# Patient Record
Sex: MALE | Race: BLACK OR AFRICAN AMERICAN
[De-identification: names, ages, dates, MRNs, and addresses within clinical notes are randomized per-mention and may not be internally consistent; named-entity substitution may affect disease eponyms.]

---

## 2017-06-23 ENCOUNTER — HOSPITAL ENCOUNTER (EMERGENCY)
Dept: HOSPITAL 53 - M ED | Age: 37
Discharge: HOME | End: 2017-06-23
Payer: SELF-PAY

## 2017-06-23 VITALS — BODY MASS INDEX: 46.54 KG/M2 | HEIGHT: 65 IN | WEIGHT: 279.33 LBS

## 2017-06-23 VITALS — SYSTOLIC BLOOD PRESSURE: 130 MMHG | DIASTOLIC BLOOD PRESSURE: 79 MMHG

## 2017-06-23 DIAGNOSIS — E66.9: ICD-10-CM

## 2017-06-23 DIAGNOSIS — I11.0: Primary | ICD-10-CM

## 2017-06-23 DIAGNOSIS — E78.5: ICD-10-CM

## 2017-06-23 DIAGNOSIS — F17.200: ICD-10-CM

## 2017-06-23 LAB
ANION GAP SERPL CALC-SCNC: 6 MEQ/L (ref 8–16)
BASOPHILS # BLD AUTO: 0.2 K/MM3 (ref 0–0.2)
BASOPHILS NFR BLD AUTO: 2 % (ref 0–1)
BUN SERPL-MCNC: 13 MG/DL (ref 7–18)
CALCIUM SERPL-MCNC: 9.2 MG/DL (ref 8.5–10.1)
CHLORIDE SERPL-SCNC: 103 MEQ/L (ref 98–107)
CO2 SERPL-SCNC: 30 MEQ/L (ref 21–32)
CREAT SERPL-MCNC: 1.11 MG/DL (ref 0.7–1.3)
EOSINOPHIL # BLD AUTO: 0.3 K/MM3 (ref 0–0.5)
EOSINOPHIL NFR BLD AUTO: 2.3 % (ref 0–3)
ERYTHROCYTE [DISTWIDTH] IN BLOOD BY AUTOMATED COUNT: 14 % (ref 11.5–14.5)
GFR SERPL CREATININE-BSD FRML MDRD: > 60 ML/MIN/{1.73_M2} (ref 60–?)
GLUCOSE SERPL-MCNC: 88 MG/DL (ref 70–105)
LARGE UNSTAINED CELL #: 0.2 K/MM3 (ref 0–0.4)
LARGE UNSTAINED CELL %: 1.6 % (ref 0–4)
LYMPHOCYTES # BLD AUTO: 3 K/MM3 (ref 1.5–4.5)
LYMPHOCYTES NFR BLD AUTO: 26.7 % (ref 24–44)
MCH RBC QN AUTO: 31.4 PG (ref 27–33)
MCHC RBC AUTO-ENTMCNC: 33.5 G/DL (ref 32–36.5)
MCV RBC AUTO: 93.9 FL (ref 80–96)
MONOCYTES # BLD AUTO: 0.7 K/MM3 (ref 0–0.8)
MONOCYTES NFR BLD AUTO: 5.9 % (ref 0–5)
NEUTROPHILS # BLD AUTO: 6.8 K/MM3 (ref 1.8–7.7)
NEUTROPHILS NFR BLD AUTO: 61.4 % (ref 36–66)
PLATELET # BLD AUTO: 251 K/MM3 (ref 150–450)
POTASSIUM SERPL-SCNC: 3.8 MEQ/L (ref 3.5–5.1)
SODIUM SERPL-SCNC: 139 MEQ/L (ref 136–145)
WBC # BLD AUTO: 11.1 K/MM3 (ref 4–10)

## 2017-06-23 NOTE — REP
CHEST, TWO VIEWS:

 

COMPARISON:  07/30/2010.

 

There is no evidence of acute infiltrate.

 

No pleural effusion is seen.

 

The heart is normal in size.

 

The mediastinal silhouette is unremarkable.

 

The visualized osseous structures are intact.

 

IMPRESSION:

 

No acute pulmonary disease.

 

 

Signed by

Dmitry Owens MD 06/23/2017 03:28 P

## 2017-06-23 NOTE — REP
Hypertension and headache.

 

PRIORS:   None.

 

TECHNIQUE:  4.5 mm contiguous transaxial sections were obtained from the skull

base to the cerebral convexities with thin cuts through the posterior fossa

without the administration of intravenous contrast.

 

FINDINGS:  The ventricles and sulci are consistent with the patient's age.  There

are no extra-axial fluid collections.  There is no mass effect.  The deep

cerebral white matter is consistent with the patient's age.

 

The orbital and petrous structures , cerebellopontine angles, and posterior fossa

are unremarkable.

 

The sella turcica, cavernous, and paracavernous structures are essentially

unremarkable.

 

The visualized portions of the paranasal sinuses and mastoid air cells are

clear.

 

Images of the skull base show no gross abnormality.

 

IMPRESSION:

 

Essentially unremarkable CT examination of the brain.

 

 

Signed by

Mike Connor DO 06/23/2017 01:43 P

## 2017-06-24 NOTE — ECGEPIP
Stationary ECG Study

                           [facility] - ED

                                       

                                       Test Date:    2017

Pat Name:     NICOLE KNOTT            Department:   

Patient ID:   [id number]                 Room:         -

Gender:       M                        Technician:   rn

:          1980               Requested By: PATRICK DUMONT

Order Number: [id number]     Reading MD:   Roni Link

                                 Measurements

Intervals                              Axis          

Rate:         80                       P:            30

MN:           159                      QRS:          21

QRSD:         97                       T:            48

QT:           366                                    

QTc:          424                                    

                           Interpretive Statements

SINUS RHYTHM

INC. RBBB

SIMILAR TO 13

 

Electronically Signed On 2017 5:54:45 EDT by Roni Link

## 2017-12-12 ENCOUNTER — HOSPITAL ENCOUNTER (OUTPATIENT)
Dept: HOSPITAL 53 - M SFHCPLAZ | Age: 37
End: 2017-12-12
Attending: NURSE PRACTITIONER
Payer: COMMERCIAL

## 2017-12-12 DIAGNOSIS — I10: Primary | ICD-10-CM

## 2017-12-12 LAB
ALBUMIN SERPL BCG-MCNC: 3.6 GM/DL (ref 3.2–5.2)
ALBUMIN/GLOB SERPL: 0.92 {RATIO} (ref 1–1.93)
ALP SERPL-CCNC: 76 U/L (ref 45–117)
ALT SERPL W P-5'-P-CCNC: 36 U/L (ref 12–78)
ANION GAP SERPL CALC-SCNC: 9 MEQ/L (ref 8–16)
AST SERPL-CCNC: 19 U/L (ref 7–37)
BILIRUB SERPL-MCNC: 0.4 MG/DL (ref 0.2–1)
BUN SERPL-MCNC: 10 MG/DL (ref 7–18)
CALCIUM SERPL-MCNC: 8.7 MG/DL (ref 8.5–10.1)
CHLORIDE SERPL-SCNC: 105 MEQ/L (ref 98–107)
CO2 SERPL-SCNC: 26 MEQ/L (ref 21–32)
CREAT SERPL-MCNC: 1.14 MG/DL (ref 0.7–1.3)
GFR SERPL CREATININE-BSD FRML MDRD: > 60 ML/MIN/{1.73_M2} (ref 60–?)
GLUCOSE SERPL-MCNC: 120 MG/DL (ref 70–105)
POTASSIUM SERPL-SCNC: 4.2 MEQ/L (ref 3.5–5.1)
PROT SERPL-MCNC: 7.5 GM/DL (ref 6.4–8.2)
SODIUM SERPL-SCNC: 140 MEQ/L (ref 136–145)

## 2018-04-20 ENCOUNTER — HOSPITAL ENCOUNTER (EMERGENCY)
Dept: HOSPITAL 53 - M ED | Age: 38
LOS: 1 days | Discharge: HOME | End: 2018-04-21
Payer: COMMERCIAL

## 2018-04-20 DIAGNOSIS — E66.9: ICD-10-CM

## 2018-04-20 DIAGNOSIS — I10: ICD-10-CM

## 2018-04-20 DIAGNOSIS — F10.10: ICD-10-CM

## 2018-04-20 DIAGNOSIS — Z79.899: ICD-10-CM

## 2018-04-20 DIAGNOSIS — G47.30: ICD-10-CM

## 2018-04-20 DIAGNOSIS — M54.32: Primary | ICD-10-CM

## 2018-04-21 RX ADMIN — HYDROCODONE BITARTRATE AND ACETAMINOPHEN 1 TAB: 5; 325 TABLET ORAL at 00:55

## 2018-04-21 RX ADMIN — KETOROLAC TROMETHAMINE 1 MG: 30 INJECTION, SOLUTION INTRAMUSCULAR at 00:55

## 2018-06-17 ENCOUNTER — HOSPITAL ENCOUNTER (OUTPATIENT)
Dept: HOSPITAL 53 - M SLEEP | Age: 38
End: 2018-06-17
Attending: NURSE PRACTITIONER
Payer: COMMERCIAL

## 2018-06-17 DIAGNOSIS — G47.33: Primary | ICD-10-CM

## 2018-06-17 PROCEDURE — 95811 POLYSOM 6/>YRS CPAP 4/> PARM: CPT

## 2018-08-14 ENCOUNTER — HOSPITAL ENCOUNTER (OUTPATIENT)
Dept: HOSPITAL 53 - M LAB | Age: 38
End: 2018-08-14
Attending: NURSE PRACTITIONER
Payer: COMMERCIAL

## 2018-08-14 DIAGNOSIS — Z13.220: ICD-10-CM

## 2018-08-14 DIAGNOSIS — E66.9: ICD-10-CM

## 2018-08-14 DIAGNOSIS — I10: Primary | ICD-10-CM

## 2018-08-14 LAB
ALBUMIN/GLOBULIN RATIO: 0.95 (ref 1–1.93)
ALBUMIN: 3.6 GM/DL (ref 3.2–5.2)
ALKALINE PHOSPHATASE: 75 U/L (ref 45–117)
ALT SERPL W P-5'-P-CCNC: 39 U/L (ref 12–78)
ANION GAP: 7 MEQ/L (ref 8–16)
AST SERPL-CCNC: 24 U/L (ref 7–37)
BILIRUBIN,TOTAL: 0.9 MG/DL (ref 0.2–1)
BLOOD UREA NITROGEN: 13 MG/DL (ref 7–18)
CALCIUM LEVEL: 8.9 MG/DL (ref 8.5–10.1)
CARBON DIOXIDE LEVEL: 29 MEQ/L (ref 21–32)
CHLORIDE LEVEL: 107 MEQ/L (ref 98–107)
CHOLEST SERPL-MCNC: 153 MG/DL (ref ?–200)
CHOLESTEROL RISK RATIO: 2.35 (ref ?–5)
CREAT UR-MCNC: 397 MG/DL
CREATININE FOR GFR: 1.24 MG/DL (ref 0.7–1.3)
FREE T4: 1.39 NG/DL (ref 0.76–1.46)
GFR SERPL CREATININE-BSD FRML MDRD: > 60 ML/MIN/{1.73_M2} (ref 60–?)
GLUCOSE, FASTING: 87 MG/DL (ref 70–100)
HDLC SERPL-MCNC: 65 MG/DL (ref 40–?)
LDL CHOLESTEROL: 74 MG/DL (ref ?–100)
MICROALBUMIN UR-MCNC: 591 MG/L
MICROALBUMIN/CREAT UR: 148.8 MCG/MG (ref 0–30)
NONHDLC SERPL-MCNC: 88 MG/DL
POTASSIUM SERUM: 4 MEQ/L (ref 3.5–5.1)
SODIUM LEVEL: 143 MEQ/L (ref 136–145)
THYROID STIMULATING HORMONE: 1.49 UIU/ML (ref 0.36–3.74)
TOTAL PROTEIN: 7.4 GM/DL (ref 6.4–8.2)
TRIGLYCERIDES LEVEL: 70 MG/DL (ref ?–150)

## 2018-08-14 PROCEDURE — 84443 ASSAY THYROID STIM HORMONE: CPT

## 2018-10-01 ENCOUNTER — HOSPITAL ENCOUNTER (OUTPATIENT)
Dept: HOSPITAL 53 - M LAB | Age: 38
End: 2018-10-01
Attending: PHYSICIAN ASSISTANT
Payer: COMMERCIAL

## 2018-10-01 DIAGNOSIS — I10: Primary | ICD-10-CM

## 2018-10-01 LAB
ANION GAP: 8 MEQ/L (ref 8–16)
BLOOD UREA NITROGEN: 17 MG/DL (ref 7–18)
CALCIUM LEVEL: 9.3 MG/DL (ref 8.5–10.1)
CARBON DIOXIDE LEVEL: 27 MEQ/L (ref 21–32)
CHLORIDE LEVEL: 103 MEQ/L (ref 98–107)
CREATININE FOR GFR: 1.37 MG/DL (ref 0.7–1.3)
GFR SERPL CREATININE-BSD FRML MDRD: > 60 ML/MIN/{1.73_M2} (ref 60–?)
GLUCOSE, FASTING: 128 MG/DL (ref 70–100)
POTASSIUM SERUM: 4.1 MEQ/L (ref 3.5–5.1)
SODIUM LEVEL: 138 MEQ/L (ref 136–145)

## 2018-10-01 PROCEDURE — 80048 BASIC METABOLIC PNL TOTAL CA: CPT

## 2018-11-05 ENCOUNTER — HOSPITAL ENCOUNTER (OUTPATIENT)
Dept: HOSPITAL 53 - M SMT | Age: 38
End: 2018-11-05
Attending: PHYSICIAN ASSISTANT
Payer: COMMERCIAL

## 2018-11-05 DIAGNOSIS — I10: Primary | ICD-10-CM

## 2018-11-05 LAB
ANION GAP: 11 MEQ/L (ref 8–16)
BLOOD UREA NITROGEN: 11 MG/DL (ref 7–18)
CALCIUM LEVEL: 8.7 MG/DL (ref 8.5–10.1)
CARBON DIOXIDE LEVEL: 23 MEQ/L (ref 21–32)
CHLORIDE LEVEL: 106 MEQ/L (ref 98–107)
CREATININE FOR GFR: 1.14 MG/DL (ref 0.7–1.3)
GFR SERPL CREATININE-BSD FRML MDRD: > 60 ML/MIN/{1.73_M2} (ref 60–?)
GLUCOSE, FASTING: 90 MG/DL (ref 70–100)
POTASSIUM SERUM: 4.1 MEQ/L (ref 3.5–5.1)
SODIUM LEVEL: 140 MEQ/L (ref 136–145)

## 2018-11-05 PROCEDURE — 80048 BASIC METABOLIC PNL TOTAL CA: CPT

## 2019-01-04 ENCOUNTER — HOSPITAL ENCOUNTER (OUTPATIENT)
Dept: HOSPITAL 53 - M RAD | Age: 39
End: 2019-01-04
Attending: NURSE PRACTITIONER
Payer: COMMERCIAL

## 2019-01-04 DIAGNOSIS — L72.9: Primary | ICD-10-CM

## 2019-01-04 DIAGNOSIS — N43.3: ICD-10-CM

## 2019-01-04 DIAGNOSIS — I86.1: ICD-10-CM

## 2019-01-04 NOTE — REP
Scrotal sonography:

 

History:  Scrotal cyst. Palpable area times 6 months.  Fluid leaked 1 month ago.

 

Findings:  There are small bilateral hydroceles.  Small bilateral varicoceles are

noted as well.  No intratesticular mass lesion is seen on either side.

Testicular Doppler flow is normal bilaterally.  Resistive indices are 0.59 on the

right and 0.68 on the left by Doppler.  Right testicular dimensions are 4.5 x 2.4

x 3.7 cm.  The left testis measures 4.3 x 2.4 x 3.1 cm.  There are cysts in the

head of the epididymis on the left, the largest of which measures 0.4 cm.

 

At the area of the palpable abnormality in the left scrotum there is focal wall

thickening and hyperemia surrounding a hypoechoic 2.0 x 0.5 x 2.1 cm intradermal

fluid collection consistent with a small abscess.

 

Impression:

 

No intratesticular mass lesion seen.  Small bilateral varicoceles and hydroceles.

Focal scrotal wall thickening and intradermal elongate fluid collection

consistent with abscess.  Tiny left epididymal cyst.

 

 

 

 

Electronically Signed by

Humphrey Srinivasan MD 01/04/2019 07:29 P

## 2019-02-25 ENCOUNTER — HOSPITAL ENCOUNTER (OUTPATIENT)
Dept: HOSPITAL 53 - M SMT | Age: 39
End: 2019-02-25
Attending: PHYSICIAN ASSISTANT
Payer: COMMERCIAL

## 2019-02-25 DIAGNOSIS — I10: Primary | ICD-10-CM

## 2019-02-25 LAB
BUN SERPL-MCNC: 20 MG/DL (ref 7–18)
CALCIUM SERPL-MCNC: 9 MG/DL (ref 8.5–10.1)
CHLORIDE SERPL-SCNC: 103 MEQ/L (ref 98–107)
CO2 SERPL-SCNC: 32 MEQ/L (ref 21–32)
CREAT SERPL-MCNC: 1.3 MG/DL (ref 0.7–1.3)
GFR SERPL CREATININE-BSD FRML MDRD: > 60 ML/MIN/{1.73_M2} (ref 60–?)
GLUCOSE SERPL-MCNC: 136 MG/DL (ref 70–100)
POTASSIUM SERPL-SCNC: 4 MEQ/L (ref 3.5–5.1)
SODIUM SERPL-SCNC: 140 MEQ/L (ref 136–145)

## 2019-02-27 ENCOUNTER — HOSPITAL ENCOUNTER (OUTPATIENT)
Dept: HOSPITAL 53 - M RAD | Age: 39
End: 2019-02-27
Attending: UROLOGY
Payer: COMMERCIAL

## 2019-02-27 DIAGNOSIS — N49.2: Primary | ICD-10-CM

## 2019-02-27 NOTE — REP
Clinical:  Scrotal abscess.

 

Comparison:  01/04/2019.

 

Technique:  Gray scale and color Doppler evaluation using linear high frequency

transducer.

 

Findings:

The bilateral testicles and epididymi are normal in contour, size, echogenicity,

and vascularity without mass lesion, infectious/inflammatory process, or torsion.

Two incidental left epididymal head cysts measure up to 2.7 and 3.5 mm each.

Small bilateral varicoceles are again noted and unchanged.  No significant

hydrocele.

 

Left scrotal wall thickening and hypoechoic collection with surrounding

vascularity consistent with previous identified abscess currently measures

approximately 2.4 x 2.4 x 0.8 cm which may be minimally increased from prior

examination and should be correlated clinically as size comparison with prior

examination may be inaccurate due to technical factors.

 

Impression:

1.  Essentially normal/stable appearance to the bilateral testicles and epididymi

with small left epididymal head cysts and small bilateral varicoceles again noted

and unchanged.

2.  Left scrotal wall thickening and abscess again identified essentially similar

to prior examination.

 

 

Electronically Signed by

Fabiano Gallagher MD 02/27/2019 10:16 A

## 2019-03-01 ENCOUNTER — HOSPITAL ENCOUNTER (OUTPATIENT)
Dept: HOSPITAL 53 - M SMT | Age: 39
End: 2019-03-01
Attending: UROLOGY
Payer: COMMERCIAL

## 2019-03-01 ENCOUNTER — HOSPITAL ENCOUNTER (OUTPATIENT)
Dept: HOSPITAL 53 - M LABSMT | Age: 39
End: 2019-03-01
Attending: UROLOGY
Payer: COMMERCIAL

## 2019-03-01 DIAGNOSIS — N49.2: Primary | ICD-10-CM

## 2019-03-25 ENCOUNTER — HOSPITAL ENCOUNTER (OUTPATIENT)
Dept: HOSPITAL 53 - M SMT | Age: 39
End: 2019-03-25
Attending: NURSE PRACTITIONER
Payer: COMMERCIAL

## 2019-03-25 DIAGNOSIS — N49.2: ICD-10-CM

## 2019-03-25 DIAGNOSIS — Z01.818: Primary | ICD-10-CM

## 2019-03-25 LAB
APTT BLD: 31.1 SECONDS (ref 25.4–37.6)
BUN SERPL-MCNC: 16 MG/DL (ref 7–18)
CALCIUM SERPL-MCNC: 8.8 MG/DL (ref 8.5–10.1)
CHLORIDE SERPL-SCNC: 105 MEQ/L (ref 98–107)
CO2 SERPL-SCNC: 29 MEQ/L (ref 21–32)
CREAT SERPL-MCNC: 1.26 MG/DL (ref 0.7–1.3)
GFR SERPL CREATININE-BSD FRML MDRD: > 60 ML/MIN/{1.73_M2} (ref 60–?)
GLUCOSE SERPL-MCNC: 100 MG/DL (ref 70–100)
HCT VFR BLD AUTO: 46.5 % (ref 42–52)
HGB BLD-MCNC: 14.8 G/DL (ref 13.5–17.5)
INR PPP: 0.97
MCH RBC QN AUTO: 30.4 PG (ref 27–33)
MCHC RBC AUTO-ENTMCNC: 31.8 G/DL (ref 32–36.5)
MCV RBC AUTO: 95.5 FL (ref 80–96)
PLATELET # BLD AUTO: 282 10^3/UL (ref 150–450)
POTASSIUM SERPL-SCNC: 4.2 MEQ/L (ref 3.5–5.1)
PROTHROMBIN TIME: 13 SECONDS (ref 12.1–14.4)
RBC # BLD AUTO: 4.87 10^6/UL (ref 4.3–6.1)
SODIUM SERPL-SCNC: 139 MEQ/L (ref 136–145)
WBC # BLD AUTO: 11.6 10^3/UL (ref 4–10)

## 2019-04-11 ENCOUNTER — HOSPITAL ENCOUNTER (OUTPATIENT)
Dept: HOSPITAL 53 - M RAD | Age: 39
End: 2019-04-11
Attending: UROLOGY
Payer: COMMERCIAL

## 2019-04-11 DIAGNOSIS — N45.4: Primary | ICD-10-CM

## 2019-04-12 NOTE — REP
Clinical:  Follow up scrotal abscess.

 

Comparison:  02/27/2019.

 

Technique:  Real time gray scale and color Doppler evaluation using linear high

frequency transducer.

 

Findings:

Bilateral testicles and epididymi are relatively normal in contour, size,

echogenicity, and vascularity without evidence for torsion,

infectious/inflammatory process, or mass lesion.  Incidental left epididymal

cysts measure 3.5 x 1.9 x 3.0 mm and 2.9 x 2.2 x 3.2 mm.  Small bilateral

hydroceles are appreciated.  No residual abscess identified.

 

Right testicle measures 5.0 x 2.5 x 4.1 cm.

Left testicle measures 4.8 x 2.3 x 2.8 cm.

 

Impression:

Small left epididymal cysts.

Otherwise essentially normal examination.

No abscess identified on current examination.

 

 

Electronically Signed by

Fabiano Gallagher MD 04/12/2019 06:04 A

## 2019-07-26 ENCOUNTER — HOSPITAL ENCOUNTER (EMERGENCY)
Dept: HOSPITAL 53 - M ED | Age: 39
Discharge: HOME | End: 2019-07-26
Payer: COMMERCIAL

## 2019-07-26 VITALS — SYSTOLIC BLOOD PRESSURE: 107 MMHG | DIASTOLIC BLOOD PRESSURE: 55 MMHG

## 2019-07-26 VITALS — HEIGHT: 65 IN | BODY MASS INDEX: 45.88 KG/M2 | WEIGHT: 275.36 LBS

## 2019-07-26 DIAGNOSIS — Z79.899: ICD-10-CM

## 2019-07-26 DIAGNOSIS — F17.210: ICD-10-CM

## 2019-07-26 DIAGNOSIS — L03.211: Primary | ICD-10-CM

## 2019-07-26 DIAGNOSIS — I10: ICD-10-CM

## 2019-07-26 LAB
ALBUMIN SERPL BCG-MCNC: 3.5 GM/DL (ref 3.2–5.2)
ALT SERPL W P-5'-P-CCNC: 29 U/L (ref 12–78)
BASOPHILS # BLD AUTO: 0.1 10^3/UL (ref 0–0.2)
BASOPHILS NFR BLD AUTO: 0.5 % (ref 0–1)
BILIRUB CONJ SERPL-MCNC: < 0.1 MG/DL (ref 0–0.2)
BILIRUB SERPL-MCNC: 0.3 MG/DL (ref 0.2–1)
CRP SERPL-MCNC: 0.37 MG/DL (ref 0–0.3)
EOSINOPHIL # BLD AUTO: 0.1 10^3/UL (ref 0–0.5)
EOSINOPHIL NFR BLD AUTO: 0.9 % (ref 0–3)
ERYTHROCYTE [SEDIMENTATION RATE] IN BLOOD BY WESTERGREN METHOD: 12 MM/HR (ref 0–15)
HCT VFR BLD AUTO: 44 % (ref 42–52)
HGB BLD-MCNC: 14.6 G/DL (ref 13.5–17.5)
LYMPHOCYTES # BLD AUTO: 3.2 10^3/UL (ref 1.5–4.5)
LYMPHOCYTES NFR BLD AUTO: 28.7 % (ref 24–44)
MCH RBC QN AUTO: 32.1 PG (ref 27–33)
MCHC RBC AUTO-ENTMCNC: 33.2 G/DL (ref 32–36.5)
MCV RBC AUTO: 96.7 FL (ref 80–96)
MONOCYTES # BLD AUTO: 1.3 10^3/UL (ref 0–0.8)
MONOCYTES NFR BLD AUTO: 11.7 % (ref 0–5)
NEUTROPHILS # BLD AUTO: 6.4 10^3/UL (ref 1.8–7.7)
NEUTROPHILS NFR BLD AUTO: 58 % (ref 36–66)
PLATELET # BLD AUTO: 259 10^3/UL (ref 150–450)
PROT SERPL-MCNC: 7.5 GM/DL (ref 6.4–8.2)
RBC # BLD AUTO: 4.55 10^6/UL (ref 4.3–6.1)
WBC # BLD AUTO: 11 10^3/UL (ref 4–10)

## 2019-07-26 PROCEDURE — 85025 COMPLETE CBC W/AUTO DIFF WBC: CPT

## 2019-07-26 PROCEDURE — 87880 STREP A ASSAY W/OPTIC: CPT

## 2019-07-26 PROCEDURE — 96361 HYDRATE IV INFUSION ADD-ON: CPT

## 2019-07-26 PROCEDURE — 80076 HEPATIC FUNCTION PANEL: CPT

## 2019-07-26 PROCEDURE — 87040 BLOOD CULTURE FOR BACTERIA: CPT

## 2019-07-26 PROCEDURE — 70491 CT SOFT TISSUE NECK W/DYE: CPT

## 2019-07-26 PROCEDURE — 83605 ASSAY OF LACTIC ACID: CPT

## 2019-07-26 PROCEDURE — 80047 BASIC METABLC PNL IONIZED CA: CPT

## 2019-07-26 PROCEDURE — 99284 EMERGENCY DEPT VISIT MOD MDM: CPT

## 2019-07-26 PROCEDURE — 86140 C-REACTIVE PROTEIN: CPT

## 2019-07-26 PROCEDURE — 85652 RBC SED RATE AUTOMATED: CPT

## 2019-07-26 PROCEDURE — 96365 THER/PROPH/DIAG IV INF INIT: CPT

## 2019-07-26 NOTE — REPVR
EXAM: 

CT Neck With Contrast 



EXAM DATE/TIME: 

7/26/2019 6:01 PM 



CLINICAL HISTORY: 

38 years old, male; Other: Left facial abscess/ludwigs; Additional info: RO 

left facial abscess/ludwigs 



TECHNIQUE: 

Imaging protocol: Axial computed tomography images of the neck with intravenous 

contrast. Coronal and sagittal reformatted images were created and reviewed. 

Radiation optimization: All CT scans at this facility use at least one of these 

dose optimization techniques: automated exposure control; mA and/or kV 

adjustment per patient size (includes targeted exams where dose is matched to 

clinical indication); or iterative reconstruction. 

Contrast material: ISOVUE 370;Contrast volume: 75 ml;Contrast route: IV; 



COMPARISON: 

No relevant prior studies available. 



FINDINGS: 

Nasopharynx: Normal. 

Oropharynx: Bilateral thickening of the tonsillar pillars with narrowing of the 

hypopharyngeal airway. Small air pockets demonstrated anteriorly beneath the 

tongue may represent trapped air bubbles in the oral cavity although a small 

anterior abscess is not excluded. 

Hypopharynx: Normal. 

Larynx: Normal. Normal epiglottis. 

Retropharyngeal space: Normal. 

Submandibular/Parotid glands: Normal. Glands are normal in size. 

Thyroid: Normal. No enlarged or calcified nodules. 

Lymph nodes: Increased thickness of the soft palate or the presence of 

adenopathy. 

Trachea: Visualized trachea is unremarkable. 

Lungs: Normal as visualized. 

Bones/joints: Normal. No acute fracture. 

Soft tissues: There is symmetric thickening of the strap muscles of the neck 

without evidence of a abscess. Inflammation not excluded. 



IMPRESSION: 

1. There is symmetric thickening of the strap muscles of the neck without 

evidence of a abscess. Inflammation not excluded. 

2. Bilateral thickening of the tonsillar pillars with narrowing of the 

hypopharyngeal airway. 

3. Small air pockets demonstrated anteriorly beneath the tongue may represent 

trapped air bubbles in the oral cavity although a small anterior abscess is not 

excluded. 

4. Increased thickness of the soft palate or the presence of adenopathy. 



Electronically signed by: Giovanni iMnor On 07/26/2019  18:38:33 PM

## 2019-07-26 NOTE — ED PDOC
Post-Departure Follow-Up


dr barbosa faxed formal report of ct neck for fu mlg Lundborg-Gray,Maja MD          Jul 26, 2019 19:51

## 2019-10-01 ENCOUNTER — HOSPITAL ENCOUNTER (OUTPATIENT)
Dept: HOSPITAL 53 - M SFHCPLAZ | Age: 39
End: 2019-10-01
Attending: NURSE PRACTITIONER
Payer: COMMERCIAL

## 2019-10-01 DIAGNOSIS — I10: Primary | ICD-10-CM

## 2019-10-01 DIAGNOSIS — E66.9: ICD-10-CM

## 2019-10-01 DIAGNOSIS — E78.5: ICD-10-CM

## 2019-10-01 LAB
ALBUMIN SERPL BCG-MCNC: 3.4 GM/DL (ref 3.2–5.2)
ALT SERPL W P-5'-P-CCNC: 32 U/L (ref 12–78)
BILIRUB SERPL-MCNC: 0.4 MG/DL (ref 0.2–1)
BUN SERPL-MCNC: 16 MG/DL (ref 7–18)
CALCIUM SERPL-MCNC: 8.7 MG/DL (ref 8.5–10.1)
CHLORIDE SERPL-SCNC: 108 MEQ/L (ref 98–107)
CHOLEST SERPL-MCNC: 127 MG/DL (ref ?–200)
CHOLEST/HDLC SERPL: 2.95 {RATIO} (ref ?–5)
CO2 SERPL-SCNC: 26 MEQ/L (ref 21–32)
CREAT SERPL-MCNC: 1.28 MG/DL (ref 0.7–1.3)
CREAT UR-MCNC: 507 MG/DL
GFR SERPL CREATININE-BSD FRML MDRD: > 60 ML/MIN/{1.73_M2} (ref 60–?)
GLUCOSE SERPL-MCNC: 111 MG/DL (ref 70–100)
HDLC SERPL-MCNC: 43 MG/DL (ref 40–?)
LDLC SERPL CALC-MCNC: 68 MG/DL (ref ?–100)
MICROALBUMIN UR-MCNC: 125 MG/L
MICROALBUMIN/CREAT UR: 24.6 MCG/MG (ref 0–30)
NONHDLC SERPL-MCNC: 84 MG/DL
POTASSIUM SERPL-SCNC: 4.1 MEQ/L (ref 3.5–5.1)
PROT SERPL-MCNC: 6.9 GM/DL (ref 6.4–8.2)
SODIUM SERPL-SCNC: 143 MEQ/L (ref 136–145)
TRIGL SERPL-MCNC: 80 MG/DL (ref ?–150)
TSH SERPL DL<=0.005 MIU/L-ACNC: 0.9 UIU/ML (ref 0.36–3.74)

## 2020-03-06 ENCOUNTER — HOSPITAL ENCOUNTER (OUTPATIENT)
Dept: HOSPITAL 53 - M SFHCPLAZ | Age: 40
End: 2020-03-06
Attending: FAMILY MEDICINE
Payer: COMMERCIAL

## 2020-03-06 DIAGNOSIS — I11.9: ICD-10-CM

## 2020-03-06 DIAGNOSIS — F10.20: Primary | ICD-10-CM

## 2020-03-06 LAB
ALBUMIN SERPL BCG-MCNC: 3.6 GM/DL (ref 3.2–5.2)
ALT SERPL W P-5'-P-CCNC: 71 U/L (ref 12–78)
APTT BLD: 30.3 SECONDS (ref 25–38.4)
BILIRUB SERPL-MCNC: 0.3 MG/DL (ref 0.2–1)
BUN SERPL-MCNC: 16 MG/DL (ref 7–18)
CALCIUM SERPL-MCNC: 9.6 MG/DL (ref 8.5–10.1)
CHLORIDE SERPL-SCNC: 105 MEQ/L (ref 98–107)
CO2 SERPL-SCNC: 29 MEQ/L (ref 21–32)
CREAT SERPL-MCNC: 1.29 MG/DL (ref 0.7–1.3)
GFR SERPL CREATININE-BSD FRML MDRD: > 60 ML/MIN/{1.73_M2} (ref 60–?)
GLUCOSE SERPL-MCNC: 114 MG/DL (ref 70–100)
HCT VFR BLD AUTO: 46.2 % (ref 42–52)
HGB BLD-MCNC: 14.8 G/DL (ref 13.5–17.5)
INR PPP: 0.95
MCH RBC QN AUTO: 30.9 PG (ref 27–33)
MCHC RBC AUTO-ENTMCNC: 32 G/DL (ref 32–36.5)
MCV RBC AUTO: 96.5 FL (ref 80–96)
PLATELET # BLD AUTO: 268 10^3/UL (ref 150–450)
POTASSIUM SERPL-SCNC: 4.2 MEQ/L (ref 3.5–5.1)
PROT SERPL-MCNC: 7.2 GM/DL (ref 6.4–8.2)
PROTHROMBIN TIME: 12.4 SECONDS (ref 11.8–14)
RBC # BLD AUTO: 4.79 10^6/UL (ref 4.3–6.1)
SODIUM SERPL-SCNC: 139 MEQ/L (ref 136–145)
WBC # BLD AUTO: 11.1 10^3/UL (ref 4–10)

## 2020-06-07 ENCOUNTER — HOSPITAL ENCOUNTER (INPATIENT)
Dept: HOSPITAL 53 - M ED | Age: 40
LOS: 1 days | Discharge: HOME | DRG: 194 | End: 2020-06-08
Attending: INTERNAL MEDICINE | Admitting: INTERNAL MEDICINE
Payer: COMMERCIAL

## 2020-06-07 VITALS — SYSTOLIC BLOOD PRESSURE: 160 MMHG | DIASTOLIC BLOOD PRESSURE: 90 MMHG

## 2020-06-07 VITALS — DIASTOLIC BLOOD PRESSURE: 89 MMHG | SYSTOLIC BLOOD PRESSURE: 134 MMHG

## 2020-06-07 VITALS — SYSTOLIC BLOOD PRESSURE: 122 MMHG | DIASTOLIC BLOOD PRESSURE: 72 MMHG

## 2020-06-07 VITALS — BODY MASS INDEX: 49.81 KG/M2 | HEIGHT: 65 IN | WEIGHT: 298.95 LBS

## 2020-06-07 VITALS — SYSTOLIC BLOOD PRESSURE: 158 MMHG | DIASTOLIC BLOOD PRESSURE: 98 MMHG

## 2020-06-07 DIAGNOSIS — F17.200: ICD-10-CM

## 2020-06-07 DIAGNOSIS — E78.5: ICD-10-CM

## 2020-06-07 DIAGNOSIS — Z11.59: ICD-10-CM

## 2020-06-07 DIAGNOSIS — I50.9: ICD-10-CM

## 2020-06-07 DIAGNOSIS — E55.9: ICD-10-CM

## 2020-06-07 DIAGNOSIS — E66.01: ICD-10-CM

## 2020-06-07 DIAGNOSIS — N17.9: ICD-10-CM

## 2020-06-07 DIAGNOSIS — Z79.899: ICD-10-CM

## 2020-06-07 DIAGNOSIS — I27.20: ICD-10-CM

## 2020-06-07 DIAGNOSIS — J98.4: ICD-10-CM

## 2020-06-07 DIAGNOSIS — J44.1: ICD-10-CM

## 2020-06-07 DIAGNOSIS — M54.5: ICD-10-CM

## 2020-06-07 DIAGNOSIS — G47.33: ICD-10-CM

## 2020-06-07 DIAGNOSIS — I11.0: Primary | ICD-10-CM

## 2020-06-07 LAB
BASOPHILS # BLD AUTO: 0.1 10^3/UL (ref 0–0.2)
BASOPHILS NFR BLD AUTO: 0.6 % (ref 0–1)
BUN SERPL-MCNC: 24 MG/DL (ref 7–18)
CALCIUM SERPL-MCNC: 8.5 MG/DL (ref 8.5–10.1)
CHLORIDE SERPL-SCNC: 104 MEQ/L (ref 98–107)
CO2 SERPL-SCNC: 23 MEQ/L (ref 21–32)
CREAT SERPL-MCNC: 1.64 MG/DL (ref 0.7–1.3)
EOSINOPHIL # BLD AUTO: 0.1 10^3/UL (ref 0–0.5)
EOSINOPHIL NFR BLD AUTO: 0.9 % (ref 0–3)
GFR SERPL CREATININE-BSD FRML MDRD: 60 ML/MIN/{1.73_M2} (ref 60–?)
GLUCOSE SERPL-MCNC: 122 MG/DL (ref 70–100)
HCT VFR BLD AUTO: 49.4 % (ref 42–52)
HGB BLD-MCNC: 16.3 G/DL (ref 13.5–17.5)
LYMPHOCYTES # BLD AUTO: 3.9 10^3/UL (ref 1.5–5)
LYMPHOCYTES NFR BLD AUTO: 36.9 % (ref 24–44)
MCH RBC QN AUTO: 31.3 PG (ref 27–33)
MCHC RBC AUTO-ENTMCNC: 33 G/DL (ref 32–36.5)
MCV RBC AUTO: 95 FL (ref 80–96)
MONOCYTES # BLD AUTO: 0.9 10^3/UL (ref 0–0.8)
MONOCYTES NFR BLD AUTO: 8.5 % (ref 0–5)
NEUTROPHILS # BLD AUTO: 5.6 10^3/UL (ref 1.5–8.5)
NEUTROPHILS NFR BLD AUTO: 52.9 % (ref 36–66)
NT-PRO BNP: 26 PG/ML (ref ?–125)
PLATELET # BLD AUTO: 234 10^3/UL (ref 150–450)
POTASSIUM SERPL-SCNC: 4 MEQ/L (ref 3.5–5.1)
RBC # BLD AUTO: 5.2 10^6/UL (ref 4.3–6.1)
SODIUM SERPL-SCNC: 137 MEQ/L (ref 136–145)
WBC # BLD AUTO: 10.6 10^3/UL (ref 4–10)

## 2020-06-07 RX ADMIN — IPRATROPIUM BROMIDE AND ALBUTEROL SULFATE SCH ML: .5; 3 SOLUTION RESPIRATORY (INHALATION) at 05:12

## 2020-06-07 RX ADMIN — IPRATROPIUM BROMIDE AND ALBUTEROL SULFATE SCH ML: .5; 3 SOLUTION RESPIRATORY (INHALATION) at 16:07

## 2020-06-07 RX ADMIN — SODIUM CHLORIDE, PRESERVATIVE FREE SCH ML: 5 INJECTION INTRAVENOUS at 21:22

## 2020-06-07 RX ADMIN — ENOXAPARIN SODIUM SCH MG: 40 INJECTION SUBCUTANEOUS at 10:55

## 2020-06-07 RX ADMIN — HYDRALAZINE HYDROCHLORIDE SCH MG: 25 TABLET, FILM COATED ORAL at 14:00

## 2020-06-07 RX ADMIN — IPRATROPIUM BROMIDE AND ALBUTEROL SULFATE SCH ML: .5; 3 SOLUTION RESPIRATORY (INHALATION) at 05:32

## 2020-06-07 RX ADMIN — HYDRALAZINE HYDROCHLORIDE SCH MG: 25 TABLET, FILM COATED ORAL at 21:20

## 2020-06-07 RX ADMIN — DEXTROSE MONOHYDRATE SCH MG: 50 INJECTION, SOLUTION INTRAVENOUS at 17:27

## 2020-06-07 RX ADMIN — NICOTINE SCH PATCH: 21 PATCH, EXTENDED RELEASE TRANSDERMAL at 10:54

## 2020-06-07 NOTE — HPEPDOC
General


Date of Admission





Date of Service:  Jun 7, 2020


Chief Complaint


The patient is a 39-year-old male admitted with a reason for visit of Chest 

Pain.


Source:  Patient


Exam Limitations:  No limitations


Timing/Duration:  Other (3-4 days)


Severity:  Other (, not applicable)


Associated Symptoms:  Other (cough, wheezing, right-sided chest pain)





History of Present Illness


This is a 39 years old, obese, -American male with past medical history 

of hypertension, hyperlipidemia, obstructive sleep apnea lumbar back pain, 

history of alcohol abuse, alcohol hepatitis. Vitamin D deficiency. Also history 

of cannabis abuse in the past and he is a current smoker, was in usual state of 

health until about 3-4 days ago when he developed right-sided chest pain, 

persistent, nonradiating, increases with inspiration, not relieved with any pain

medication, associated with shortness of breath and wheezing. Patient was seen 

in ED and was diagnosed with exacerbation of COPD and possible congestive heart 

failure and hospitalist team was called to admit patient for further workup and 

management. On my interview, patient feels slightly little better after getting 

the nebulizer treatment and Lasix, but this still has wheezing and complaining 

of right-sided chest pain which previously has been present for the last 3-4 

days





Home Medications


Scheduled


Bisoprolol Fumarate (Bisoprolol Fumarate) 5 Mg Tablet, 5 MG PO DAILY, (Reported)


Chlorthalidone (Chlorthalidone) 12.5 Mg Halftab, 12.5 MG PO DAILY, (Reported)


Lisinopril (Lisinopril) 40 Mg Tab, 40 MG PO DAILY, (Reported)





Allergies


Coded Allergies:  


     No Known Allergies (Unverified , 6/23/17)





Past Medical History


Medical History


hypertension, hyperlipidemia, obstructive sleep apnea lumbar back pain, history 

of alcohol abuse, alcohol hepatitis. Vitamin D deficiency


Surgical History


Drainage of scrotal abscess





Family History


Family history reveals father has hypertension and is living and mother is de

ceased with colon cancer





Social History


* Smoker:  current smoker


Alcohol:  Denies


Drugs:  denies





A-FIB/CHADSVASC


A-FIB History


Current/History of A-Fib/PAF?:  No





Review of Systems


Constitutional:  Denies: Chills, Fever, Malaise, Night Sweats, Weakness, 

Fatigue, Weight Loss, Lethargy, Other


Eyes:  Denies: Pain, Vision change, Conjunctivae inflammation, Eyelid 

inflammation, Redness, Other


ENT:  Denies: Head Aches, Ear Pain, Dysphagia


Skin:  Denies: Rash, Lesions, Jaundice, Bruising, Itching, Dry, Breakdown, Nail 

Changes, Other


Pulmonary:  Reports: Dyspnea, Cough


Cardiovascular:  Reports: Chest Pain


Gastrointestinal:  Denies: Nausea, Vomiting, Abdominal Pain, Diarrhea, 

Constipation, Melena, Hematochezia, Other Symptoms


Genitourinary:  Denies: Dysuria, Frequency, Incontinence, Hematuria, Retention, 

Other Symptoms


Hematologic:  Denies: Bruising, Bleeding Excessively, Petecchia, Purpura, 

Enlarged Lymph Nodes, Other Hematologic


Endocrine:  Denies: Polydipsia, Polyphagia, Polyuria, Heat Intolerance, Cold 

Intolerance, Other Endocrine Sx


Musculoskeletal:  Denies: Neck Pain, Back Pain, Shoulder Pain, Arm Pain, Hand 

Pain, Leg Pain, Foot Pain, Joint Pain, Muscle Pain, Spasms, Other Symptoms


Neurological:  Denies: Weakness, Numbness, Incoordination, Change in speech, 

Confusion, Seizures, Other Symptoms


Psych:  Denies: Mood Normal, Anxiety, Depression, Memory Issues, Thoughts of 

Self Harm, Anger, Thoughts of Harming Other, Other Psych





Physical Examination


General Exam:  Positive: Alert, Cooperative


Eye Exam:  Positive: PERRLA, Conjunctiva & lids normal


ENT Exam:  Positive: Atraumatic, Mucous membr. moist/pink


Neck Exam:  Positive: Supple


Chest Exam:  Positive: Other (. Bilateral wheezing audible, which is inspiratory

and expiratory in nature)


Heart Exam:  Positive: Rate Normal, Normal S1, Normal S2


Abdomen Exam:  Positive: Normal bowel sounds, Soft


Extremity Exam:  Positive: Edema (, 1+ bipedal edema)


Skin Exam:  Positive: Nl turgor and temperature


Neuro Exam:  Positive: Strength at 5/5 X4 ext, Sensation Intact, Cranial Nerves 

3-12 NL


Psych Exam:  Positive: Mood NL, Oriented x 3





Vital Signs





Vital Signs








  Date Time  Temp Pulse Resp B/P (MAP) Pulse Ox O2 Delivery O2 Flow Rate FiO2


 


6/7/20 05:15    129/62 (84)    


 


6/7/20 05:04  85   89   


 


6/7/20 04:44        91


 


6/7/20 04:04 97.2  24   Room Air  











Laboratory Data


Labs 24H


Laboratory Tests 2


6/7/20 05:07: 


POC pH (Misc Panel) 7.303L, POC Base Excess (Misc Panel) -4.0L, POC Saturated 

Percent O2 (Misc) 88L, POC pO2 (Misc Panel) 61.0L, POC pCO2 (Misc Panel) 44.8, 

POC HCO3 (Misc Panel) 22.2, POC Total CO2 (Misc Panel) 24.0


6/7/20 05:08: 


Immature Granulocyte % (Auto) 0.2, Neutrophils (%) (Auto) 52.9, Lymphocytes (%) 

(Auto) 36.9, Monocytes (%) (Auto) 8.5H, Eosinophils (%) (Auto) 0.9, Basophils 

(%) (Auto) 0.6, Neutrophils # (Auto) 5.6, Lymphocytes # (Auto) 3.9, Monocytes # 

(Auto) 0.9H, Eosinophils # (Auto) 0.1, Basophils # (Auto) 0.1, Nucleated Red 

Blood Cells % (auto) 0.0, Anion Gap 10, Glomerular Filtration Rate 60.0, Calcium

Level 8.5, NT-Pro-B-Type Natriuretic Peptide 26


CBC/BMP


Laboratory Tests


6/7/20 05:08








Microbiology





Microbiology


6/7/20 Respiratory Virus Panel (PCR) ([facility]), Received


         Pending





Problems





(1) COPD exacerbation


Status:  Acute


Problem Text:  39 years old, obese, -American male with past medical 

history of hypertension, hyperlipidemia, obstructive sleep apnea lumbar back 

pain, history of alcohol abuse, alcohol hepatitis. Vitamin D deficiency admitted

with exacerbation of COPD and possible congestive heart failure. Patient's chest

x-ray is a poor penetration secondary to his body habitus, but does not show any

infiltrate, questionable increased pulmonary is clear markings


EKG normal sinus rhythm, no ST-T changes


Blood gas shows pH 7.30, PO2 61, PCO2 44 and 88%


WBC count 10.6, hemoglobin 16.3, hematocrit 49.4, platelets of 234


 Electrolytes are normal except BUN is 24 and creatinine is 1.64


Patient does have a history of alcohol abuse and nicotine addiction. Shouldn't 

denies alcohol intake, but conforms smoking half pack per day


Patient received Decadron nebulizer and Lasix IV in the emergency room


Admit patient to PCU with telemetry monitor


Solu-Medrol 60 mg IV every 8 hour


DuoNeb every 6 hours as a scheduled


Proventil neb every 2 hours when necessary for breakthrough shortness of breath 

or wheezing


Oxygen support


Smoking cessation counseling done


NicoDerm CQ 21 mg patch daily


Diet 2 g sodium


Activity bedrest with bathroom privileges


DVT prophylaxis with Lovenox





Note regarding is a history of obstructive sleep apnea, since patient does not 

have his CPAP unit. I will order the inpatient CPAP while he is sleeping or 

taking naps





(2) Congestive heart failure (CHF)


Status:  Acute


Problem Text:  Questionable diagnosis of congestive heart failure. The patient 

does does not have a history of congestive heart failure are CAD. His BNP is 

within normal range. Chest x-ray shows questionable increase in pulmonary 

vasculature, but due to his body habitus. There wasn't a good penetration by x-

rays to get a better picture


He was given Lasix IV in the emergency room for diuresis


Intake and output


On physical examination he has wheezing but no rales, or crackle, but he does 

have 1+ bipedal edema. It could be cor pulmonale or pulmonary hypertension but 

doubt congestive heart failure


Will not continue diuretics at this time but we'll reconsider starting once his 

echocardiogram confirms either systolic or diastolic heart failure


Also is holding his lisinopril and chlorthalidone secondary to acute kidney 

injury


Continue bisoprolol as per orders


Echocardiogram has been ordered to review his cardiac structure and the status 


Will order Serial troponin as well





(3) ZEB (obstructive sleep apnea)


Status:  Chronic


Problem Text:  


Inpatient CPAP. 5. He is taking naps at nighttime





(4) Morbid obesity


Status:  Chronic


Problem Text:  He will be counseled regarding diet and exercise. Once he is 

clinically stable





(5) Hypertension


Status:  Chronic


Problem Text:  His home medication, lisinopril and chlorthalidone are on hold


And will continue bisoprolol 5 mg by mouth daily


Monitor patient's blood pressure. If continued to start rising, then he might 

need some different agents, which are not nephrotoxic  





(6) REINA (acute kidney injury)


Status:  Acute


Problem Text:  I don't have patient's baseline renal function is available


Could be REINA on base line CKD secondary to his home diuretics and meds


We will hold his lisinopril and chlorthalidone as they can cause REINA or worsen 

CKD


Monitor I&O's/renal functions, it the renal function do not improve, then he 

might require nephrology consultation








Plan / VTE


VTE Prophylaxis Ordered?:  Yes











KATHRIN PEREZ MD               Jun 7, 2020 06:18

## 2020-06-08 VITALS — DIASTOLIC BLOOD PRESSURE: 73 MMHG | SYSTOLIC BLOOD PRESSURE: 123 MMHG

## 2020-06-08 VITALS — DIASTOLIC BLOOD PRESSURE: 98 MMHG | SYSTOLIC BLOOD PRESSURE: 140 MMHG

## 2020-06-08 VITALS — DIASTOLIC BLOOD PRESSURE: 92 MMHG | SYSTOLIC BLOOD PRESSURE: 168 MMHG

## 2020-06-08 VITALS — DIASTOLIC BLOOD PRESSURE: 78 MMHG | SYSTOLIC BLOOD PRESSURE: 152 MMHG

## 2020-06-08 VITALS — DIASTOLIC BLOOD PRESSURE: 73 MMHG | SYSTOLIC BLOOD PRESSURE: 133 MMHG

## 2020-06-08 LAB
ALBUMIN SERPL BCG-MCNC: 3.6 GM/DL (ref 3.2–5.2)
ALT SERPL W P-5'-P-CCNC: 75 U/L (ref 12–78)
BILIRUB SERPL-MCNC: 0.3 MG/DL (ref 0.2–1)
BUN SERPL-MCNC: 33 MG/DL (ref 7–18)
CALCIUM SERPL-MCNC: 9.1 MG/DL (ref 8.5–10.1)
CHLORIDE SERPL-SCNC: 106 MEQ/L (ref 98–107)
CO2 SERPL-SCNC: 25 MEQ/L (ref 21–32)
CREAT SERPL-MCNC: 1.57 MG/DL (ref 0.7–1.3)
GFR SERPL CREATININE-BSD FRML MDRD: > 60 ML/MIN/{1.73_M2} (ref 60–?)
GLUCOSE SERPL-MCNC: 155 MG/DL (ref 70–100)
HCT VFR BLD AUTO: 48.5 % (ref 42–52)
HGB BLD-MCNC: 16.2 G/DL (ref 13.5–17.5)
MAGNESIUM SERPL-MCNC: 1.9 MG/DL (ref 1.8–2.4)
MCH RBC QN AUTO: 31.7 PG (ref 27–33)
MCHC RBC AUTO-ENTMCNC: 33.4 G/DL (ref 32–36.5)
MCV RBC AUTO: 94.9 FL (ref 80–96)
PLATELET # BLD AUTO: 228 10^3/UL (ref 150–450)
POTASSIUM SERPL-SCNC: 3.9 MEQ/L (ref 3.5–5.1)
PROT SERPL-MCNC: 7.6 GM/DL (ref 6.4–8.2)
RBC # BLD AUTO: 5.11 10^6/UL (ref 4.3–6.1)
SODIUM SERPL-SCNC: 141 MEQ/L (ref 136–145)
WBC # BLD AUTO: 14.4 10^3/UL (ref 4–10)

## 2020-06-08 RX ADMIN — SODIUM CHLORIDE, PRESERVATIVE FREE SCH ML: 5 INJECTION INTRAVENOUS at 05:25

## 2020-06-08 RX ADMIN — HYDRALAZINE HYDROCHLORIDE SCH MG: 25 TABLET, FILM COATED ORAL at 05:25

## 2020-06-08 RX ADMIN — NICOTINE SCH PATCH: 21 PATCH, EXTENDED RELEASE TRANSDERMAL at 08:58

## 2020-06-08 RX ADMIN — IPRATROPIUM BROMIDE AND ALBUTEROL SULFATE SCH ML: .5; 3 SOLUTION RESPIRATORY (INHALATION) at 00:41

## 2020-06-08 RX ADMIN — HYDRALAZINE HYDROCHLORIDE SCH MG: 25 TABLET, FILM COATED ORAL at 13:50

## 2020-06-08 RX ADMIN — IPRATROPIUM BROMIDE AND ALBUTEROL SULFATE SCH ML: .5; 3 SOLUTION RESPIRATORY (INHALATION) at 16:02

## 2020-06-08 RX ADMIN — IPRATROPIUM BROMIDE AND ALBUTEROL SULFATE SCH ML: .5; 3 SOLUTION RESPIRATORY (INHALATION) at 07:44

## 2020-06-08 RX ADMIN — DEXTROSE MONOHYDRATE SCH MG: 50 INJECTION, SOLUTION INTRAVENOUS at 08:56

## 2020-06-08 RX ADMIN — SODIUM CHLORIDE, PRESERVATIVE FREE SCH ML: 5 INJECTION INTRAVENOUS at 15:11

## 2020-06-08 RX ADMIN — DEXTROSE MONOHYDRATE SCH MG: 50 INJECTION, SOLUTION INTRAVENOUS at 15:10

## 2020-06-08 RX ADMIN — ENOXAPARIN SODIUM SCH MG: 40 INJECTION SUBCUTANEOUS at 08:57

## 2020-06-08 NOTE — REP
PORTABLE:

 

REASON FOR EXAM:  Cough.

 

Preliminary report given by Dr. Gallagher.

 

Latest prior for comparison is 06/23/2017.

 

FINDINGS:

 

The technique utilized in obtaining the radiograph has magnified the cardiac

silhouette and accentuated the interstitial markings.

 

The superior mediastinal structures are midline.  The cardiac silhouette is

unremarkable in size, shape, and position.  The diaphragmatic surfaces of the

lungs are regular, and the costophrenic angles are clear.  The pulmonary fields

are clear.  The imaged osseous structures are intact.

 

IMPRESSION:

 

There is no acute cardiopulmonary disease.

 

 

 

 

Electronically Signed by

Mike Connor DO 06/08/2020 03:15 P

## 2020-06-08 NOTE — IPNPDOC
Text Note


Date of Service


The patient was seen on 6/8/20.





NOTE


Subjective: Feels better today, No SOB.  No wheezing or chest pain or tightness.







Hospital course: This is a 39 years old, obese, -American male with past 

medical history of hypertension, hyperlipidemia, obstructive sleep apnea lumbar 

back pain, history of alcohol abuse, alcohol hepatitis. Vitamin D deficiency. 

Also history of cannabis abuse in the past and he is a current smoker, was in 

usual state of health until about 3-4 days ago when he developed right-sided ch

est pain, persistent, nonradiating, increases with inspiration, not relieved 

with any pain medication, associated with shortness of breath and wheezing. 

Patient was seen in ED and was diagnosed with exacerbation of COPD and possible 

congestive heart failure. Does not have COPD. Possibly has restrictive lung 

disease, with ZEB and pulmonary hypertension and diastolic chf.  





Congestive heart failure (CHF)


echo done reports awaiting. 


Drinks lots of water, juice, beer, some soda. 


Increase chlorthalidone dose to 25.


fluid restriction, I/O, 2 gram sodium diet. Fluid restriction 2 liters. 





ZEB (obstructive sleep apnea)


Has not been using his CPAP for about 6 months 





Morbid obesity with possibly restrictive lung disease


complicating care





Hypertension


bisoprolol, lisinopril, chlorthalidone





REINA (acute kidney injury)


due to fluid overload and CHF





VS,Fishbone, I+O


VS, Fishbone, I+O


Laboratory Tests


6/8/20 05:01











Vital Signs








  Date Time  Temp Pulse Resp B/P (MAP) Pulse Ox O2 Delivery O2 Flow Rate FiO2


 


6/8/20 05:25    140/80    


 


6/8/20 04:00 97.6 83 18  97 NIPPV (BIPAP/CPAP)  


 


6/7/20 20:00       1.0 


 


6/7/20 04:44        91














I&O- Last 24 Hours up to 6 AM 


 


 6/8/20





 06:00


 


Intake Total 1140 ml


 


Output Total 3575 ml


 


Balance -2435 ml











General


Date of Admission


Jun 7, 2020 at 05:52


Date of Discharge


06/08/20





Discharge Summary


PROCEDURES PERFORMED DURING STAY: [None].





DISCHARGE DIAGNOSES:


CHF exacerbation. 





SECONDARY DIAGNOSIS:


Morbid obesity, Hypertension, hyperlipidemia, obstructive sleep apnea, lumbar 

back pain, history of alcohol abuse, alcohol hepatitis. Vitamin D deficiency, 

history of cannabis abuse in the past, smoker.





COMPLICATIONS/CHIEF COMPLAINT: Chf, Copd.





HOSPITAL COURSE: As above. 





DISCHARGE MEDICATIONS: Please see below.


 


ALLERGIES: Please see below.





PHYSICAL EXAMINATION ON DISCHARGE:


VITAL SIGNS: Please see below.


General Exam:  Positive: Alert, Cooperative


Eye Exam:  Positive: PERRLA, Conjunctiva & lids normal


ENT Exam:  Positive: Atraumatic, Mucous membrane. moist/pink


Neck Exam:  Positive: Supple, thick, obese


Chest Exam:  Positive: clear to auscultation, no ronchi or wheezing


Heart Exam:  Positive: Rate Normal, Normal S1, Normal S2, no rub, murmur or 

gallop. 


Abdomen Exam:  Positive: Normal bowel sounds, Soft, nontender. 


Extremity Exam:  Positive: Edema (, 1+ bipedal edema)


Skin Exam:  Positive: Nl turgor and temperature


Neuro Exam:  Positive: Strength at 5/5 X4 ext, Sensation Intact, Cranial Nerves 

3-12 NL


Psych Exam:  Positive: Mood NL, Oriented x 3





LABORATORY DATA: Please see below.





IMAGING: CXR no acute disease. 





ACTIVITY: [As tolerated].





DIET: 2 gram sodium, 2 liters fluid restriction. 





DISCHARGE PLAN: Home





DISCHARGE INSTRUCTIONS:


Follow up with PMD in 1 week. 


Follow up Echo. 





DISCHARGE CONDITION: [Stable].





TIME SPENT ON DISCHARGE:35 minutes.





Vital Signs/I&Os





Vital Signs








  Date Time  Temp Pulse Resp B/P (MAP) Pulse Ox O2 Delivery O2 Flow Rate FiO2


 


6/8/20 13:50    140/98 (112)    


 


6/8/20 12:00 97.0 86 19  97 Room Air  


 


6/7/20 20:00       1.0 


 


6/7/20 04:44        91














I&O- Last 24 Hours up to 6 AM 


 


 6/8/20





 07:00


 


Intake Total 1140 ml


 


Output Total 3575 ml


 


Balance -2435 ml











Laboratory Data


Labs 24H


Laboratory Tests 2


6/7/20 18:08: Troponin I < 0.02


6/8/20 05:01: 


Nucleated Red Blood Cells % (auto) 0.0, Anion Gap 10, Glomerular Filtration Rate

 > 60.0, Calcium Level 9.1, Magnesium Level 1.9, Total Bilirubin 0.3, Aspartate 

Amino Transf (AST/SGOT) 19, Alanine Aminotransferase (ALT/SGPT) 75, Alkaline 

Phosphatase 69, Total Protein 7.6, Albumin 3.6, Albumin/Globulin Ratio 0.9


CBC/BMP


Laboratory Tests


6/8/20 05:01











Microbiology





Microbiology


6/7/20 Respiratory Virus Panel (PCR) ([facility]) - Final, Complete





Discharge Medications


Scheduled


Bisoprolol Fumarate (Bisoprolol Fumarate) 5 Mg Tablet, 5 MG PO DAILY, (Reported)


Chlorthalidone (Chlorthalidone) 12.5 Mg Halftab, 25 MG PO DAILY


Lisinopril (Lisinopril) 40 Mg Tab, 40 MG PO DAILY, (Reported)





Scheduled PRN


Albuterol Sulfate (Ventolin Hfa) 18 Gm Hfa.aer.ad, 2 PUFF INH Q4-6HP PRN for 

wheezing





Allergies


Coded Allergies:  


     No Known Allergies (Unverified , 6/23/17)











SIMONA RANKIN MD                    Jun 8, 2020 07:41

## 2020-06-08 NOTE — ECHO
DATE OF PROCEDURE:  06/8/02020

 

REFERRING PHYSICIAN:  Dr. Alistair Gibbs

INDICATION:  Chest pain, unspecified.

 

HEIGHT:  155 cm

WEIGHT:  129 kg

 

2D MEASUREMENTS:

Left atrium: 3.0 cm

Aortic root:  3.1 cm

Ventricular septum:  1.08 cm

Posterior wall:  1.10 cm

Left ventricle diastole:  5.5 cm

Inferior vena cava:  2.1 cm

Aortic annulus:  2.1 cm

 

DOPPLER MEASUREMENTS:

No aortic stenosis.

No aortic regurgitation.

Aortic valve velocity:  132 cm/s

LVOT velocity:  116 cm/s

LVOT VTI:  21.8 cm

No mitral regurgitation.

No mitral stenosis.

Mitral E velocity:  68.6 cm/s

Mitral A velocity:  63.7 cm/s

No tricuspid regurgitation.

No pulmonic regurgitation.

Pulmonary artery systolic pressure:  33 mmHg by pulmonary acceleration time.

 

MITRAL ANNULAR TISSUE DOPPLER:

E prime septal:  6.3 cm/s

E prime lateral:  7.1 cm/s

 

DESCRIPTION:  Rhythm was sinus. This was a moderately technically difficult

echocardiogram. This was a 2D, M-mode, color flow Doppler and pulse wave Doppler

examination and included mitral annular tissue Doppler.

 

CONCLUSIONS:

1. Normal left ventricle internal dimensions and wall thickness. Normal regional

left ventricular (LV) wall motion and wall thickening. Normal LV systolic

function. Left ventricular ejection fraction (LVEF) 60% by visual estimate.

Probably normal LV diastolic function.

 

2. No pericardial effusion.

 

3. Suggestive of very mild elevation of pulmonary artery systolic pressure.

 

4. Otherwise normal appearing echocardiogram Doppler findings.

 

5. Moderately technically difficult echocardiogram.

## 2020-06-17 ENCOUNTER — HOSPITAL ENCOUNTER (OUTPATIENT)
Dept: HOSPITAL 53 - M SFHCPLAZ | Age: 40
End: 2020-06-17
Attending: FAMILY MEDICINE
Payer: COMMERCIAL

## 2020-06-17 DIAGNOSIS — N17.9: ICD-10-CM

## 2020-06-17 DIAGNOSIS — D72.829: Primary | ICD-10-CM

## 2020-06-17 LAB
BASOPHILS # BLD AUTO: 0.1 10^3/UL (ref 0–0.2)
BASOPHILS NFR BLD AUTO: 0.5 % (ref 0–1)
BUN SERPL-MCNC: 33 MG/DL (ref 7–18)
CALCIUM SERPL-MCNC: 9.1 MG/DL (ref 8.5–10.1)
CHLORIDE SERPL-SCNC: 105 MEQ/L (ref 98–107)
CO2 SERPL-SCNC: 24 MEQ/L (ref 21–32)
CREAT SERPL-MCNC: 1.51 MG/DL (ref 0.7–1.3)
EOSINOPHIL # BLD AUTO: 0.1 10^3/UL (ref 0–0.5)
EOSINOPHIL NFR BLD AUTO: 0.7 % (ref 0–3)
GFR SERPL CREATININE-BSD FRML MDRD: > 60 ML/MIN/{1.73_M2} (ref 60–?)
GLUCOSE SERPL-MCNC: 99 MG/DL (ref 70–100)
HCT VFR BLD AUTO: 49.8 % (ref 42–52)
HGB BLD-MCNC: 16.3 G/DL (ref 13.5–17.5)
LYMPHOCYTES # BLD AUTO: 3.2 10^3/UL (ref 1.5–5)
LYMPHOCYTES NFR BLD AUTO: 33.7 % (ref 24–44)
MCH RBC QN AUTO: 31.5 PG (ref 27–33)
MCHC RBC AUTO-ENTMCNC: 32.7 G/DL (ref 32–36.5)
MCV RBC AUTO: 96.3 FL (ref 80–96)
MONOCYTES # BLD AUTO: 1 10^3/UL (ref 0–0.8)
MONOCYTES NFR BLD AUTO: 10.3 % (ref 0–5)
NEUTROPHILS # BLD AUTO: 5.2 10^3/UL (ref 1.5–8.5)
NEUTROPHILS NFR BLD AUTO: 54.4 % (ref 36–66)
PLATELET # BLD AUTO: 249 10^3/UL (ref 150–450)
POTASSIUM SERPL-SCNC: 4.6 MEQ/L (ref 3.5–5.1)
RBC # BLD AUTO: 5.17 10^6/UL (ref 4.3–6.1)
SODIUM SERPL-SCNC: 135 MEQ/L (ref 136–145)
WBC # BLD AUTO: 9.6 10^3/UL (ref 4–10)

## 2020-07-06 ENCOUNTER — HOSPITAL ENCOUNTER (OUTPATIENT)
Dept: HOSPITAL 53 - M CARPUL | Age: 40
End: 2020-07-06
Attending: FAMILY MEDICINE
Payer: COMMERCIAL

## 2020-07-06 DIAGNOSIS — R06.2: ICD-10-CM

## 2020-07-06 DIAGNOSIS — R94.2: Primary | ICD-10-CM

## 2020-07-06 DIAGNOSIS — F17.200: ICD-10-CM

## 2020-07-06 NOTE — PFTRPT
Visit Date: 07/06/2020 

Referring Doctor: Nichol Don MD

Height: 65.00  Inches  Weight: 270.00  Lbs  BSA: 2.25

Diagnosis: R06.2



Pre and postbronchodilator study of excellent technical quality.  Forced vital 
capacity normal.  FEV1 in proportion.  Obstructive index is therefore normal.  
Expiratory limb of the flow volume loop is normal.  No significant 
bronchodilator response is identified.  Total lung capacity is borderline.  
Residual volume is in proportion.  Diffusing capacity is reduced, but is 
appropriate for alveolar volume. Hemoglobin acceptable at 15.8.  Airways 
resistance and conductance are normal.    



IMPRESSION:  Borderline restrictive impairment with diffusing capacity 
impairment.  Please correlate clinically.





MTDD

## 2020-07-20 ENCOUNTER — HOSPITAL ENCOUNTER (OUTPATIENT)
Dept: HOSPITAL 53 - M WHC | Age: 40
End: 2020-07-20
Attending: FAMILY MEDICINE
Payer: COMMERCIAL

## 2020-07-20 DIAGNOSIS — N28.1: ICD-10-CM

## 2020-07-20 DIAGNOSIS — N17.9: Primary | ICD-10-CM

## 2020-07-20 NOTE — REP
Clinical:  Acute renal injury.

 

Comparison:  09/25/2013.

 

Technique:  Real time gray scale and color evaluation using curved array

transducer.

 

Findings:

The bilateral kidneys are normal in contour, size, echogenicity, and reniform

shape.  No perinephric fluid collection.  No hydronephrosis, nephrolithiasis,

cystic or renal mass lesion.  Right kidney measures 11.0 x 5.1 x 4.8 cm (RI 0.55)

and includes 2 cm lower pole simple cyst.  Left kidney measures 12.6 x 6.1 x 5.5

cm (RI 0.48).  Bladder is under distended but grossly normal in appearance as

visualized.

 

Impression:

Essentially normal renal ultrasound.

2 cm simple lower pole right renal cyst.

 

 

Electronically Signed by

Fabiano Gallagher MD 07/20/2020 11:07 A

## 2021-03-04 ENCOUNTER — HOSPITAL ENCOUNTER (OUTPATIENT)
Dept: HOSPITAL 53 - M SFHCPLAZ | Age: 41
End: 2021-03-04
Attending: FAMILY MEDICINE
Payer: COMMERCIAL

## 2021-03-04 DIAGNOSIS — F10.20: ICD-10-CM

## 2021-03-04 DIAGNOSIS — I11.9: Primary | ICD-10-CM

## 2021-03-04 LAB
ALBUMIN SERPL BCG-MCNC: 3.9 GM/DL (ref 3.2–5.2)
ALT SERPL W P-5'-P-CCNC: 103 U/L (ref 12–78)
BILIRUB SERPL-MCNC: 0.5 MG/DL (ref 0.2–1)
BUN SERPL-MCNC: 22 MG/DL (ref 7–18)
CALCIUM SERPL-MCNC: 9.3 MG/DL (ref 8.5–10.1)
CHLORIDE SERPL-SCNC: 95 MEQ/L (ref 98–107)
CO2 SERPL-SCNC: 27 MEQ/L (ref 21–32)
CREAT SERPL-MCNC: 1.91 MG/DL (ref 0.7–1.3)
GFR SERPL CREATININE-BSD FRML MDRD: 50.1 ML/MIN/{1.73_M2} (ref 60–?)
GLUCOSE SERPL-MCNC: 91 MG/DL (ref 70–100)
POTASSIUM SERPL-SCNC: 4.7 MEQ/L (ref 3.5–5.1)
PROT SERPL-MCNC: 8.1 GM/DL (ref 6.4–8.2)
SODIUM SERPL-SCNC: 130 MEQ/L (ref 136–145)

## 2021-03-08 ENCOUNTER — HOSPITAL ENCOUNTER (OUTPATIENT)
Dept: HOSPITAL 53 - M PLALAB | Age: 41
End: 2021-03-08
Attending: FAMILY MEDICINE
Payer: COMMERCIAL

## 2021-03-08 DIAGNOSIS — E87.1: ICD-10-CM

## 2021-03-08 DIAGNOSIS — N18.31: Primary | ICD-10-CM

## 2021-03-08 DIAGNOSIS — R74.01: ICD-10-CM

## 2021-03-08 LAB
ALBUMIN SERPL BCG-MCNC: 3.8 GM/DL (ref 3.2–5.2)
ALT SERPL W P-5'-P-CCNC: 95 U/L (ref 12–78)
BILIRUB SERPL-MCNC: 0.3 MG/DL (ref 0.2–1)
BUN SERPL-MCNC: 32 MG/DL (ref 7–18)
CALCIUM SERPL-MCNC: 9.3 MG/DL (ref 8.5–10.1)
CHLORIDE SERPL-SCNC: 95 MEQ/L (ref 98–107)
CO2 SERPL-SCNC: 26 MEQ/L (ref 21–32)
CREAT SERPL-MCNC: 2.05 MG/DL (ref 0.7–1.3)
GFR SERPL CREATININE-BSD FRML MDRD: 46.2 ML/MIN/{1.73_M2} (ref 60–?)
GLUCOSE SERPL-MCNC: 109 MG/DL (ref 70–100)
HBV CORE IGM SER QL: NEGATIVE
HBV SURFACE AB SER-ACNC: NEGATIVE M[IU]/ML
HEPATITIS A ANTIBODY IGM: NEGATIVE
OSMOLALITY UR: 346 MOSM/KG (ref 500–800)
POTASSIUM SERPL-SCNC: 4.5 MEQ/L (ref 3.5–5.1)
PROT SERPL-MCNC: 7.7 GM/DL (ref 6.4–8.2)
SODIUM SERPL-SCNC: 129 MEQ/L (ref 136–145)
SODIUM UR-SCNC: 57 MEQ/L
T4 FREE SERPL-MCNC: 1.11 NG/DL (ref 0.76–1.46)
TSH SERPL DL<=0.005 MIU/L-ACNC: 0.91 UIU/ML (ref 0.36–3.74)

## 2021-03-19 ENCOUNTER — HOSPITAL ENCOUNTER (OUTPATIENT)
Dept: HOSPITAL 53 - M WHC | Age: 41
End: 2021-03-19
Attending: FAMILY MEDICINE
Payer: COMMERCIAL

## 2021-03-19 DIAGNOSIS — N18.31: Primary | ICD-10-CM

## 2021-03-19 DIAGNOSIS — N28.1: ICD-10-CM

## 2021-03-19 NOTE — REP
INDICATION:

N18.31 STAGE 3a CHRONIC KIDNEY DISEASE.



COMPARISON:

07/20/2020



TECHNIQUE:

Multiple sonographic images of the kidneys bilaterally.



FINDINGS:

The right kidney measures 11.9 x 4.7 x 5.2 cm.

The left kidney measures 11.3 x 6.2 x 4.3 cm.

Renal cortical echogenicity is normal bilaterally.

There is no hydronephrosis on the right than the left.

There are no renal calculi.

There are no solid renal masses.

There is a Bosniak type 1 right renal cyst at the midpole measuring 0.8 cm.

There is a Bosniak type 1 right renal cyst at the  lower pole measuring 0.8 cm.

There is a Bosniak type 1 left renal exophytic cyst at the upper pole measuring 0.6 cm.

Doppler evaluation:

The resistive index in the parenchymal arteries of the right kidney is 0.79 and left

kidney 0.75.

Bladder:

The bladder is not visualized.



IMPRESSION:

Essentially negative renal ultrasound.

There are bilateral Bosniak type 1 renal cysts as described.





<Electronically signed by Dmitry Bond > 03/19/21 [id number]

## 2021-04-06 ENCOUNTER — HOSPITAL ENCOUNTER (OUTPATIENT)
Dept: HOSPITAL 53 - M PLALAB | Age: 41
End: 2021-04-06
Attending: FAMILY MEDICINE
Payer: COMMERCIAL

## 2021-04-06 DIAGNOSIS — N18.31: Primary | ICD-10-CM

## 2021-04-06 LAB
ALBUMIN SERPL BCG-MCNC: 3.2 GM/DL (ref 3.2–5.2)
ALT SERPL W P-5'-P-CCNC: 324 U/L (ref 12–78)
BILIRUB SERPL-MCNC: 0.3 MG/DL (ref 0.2–1)
BUN SERPL-MCNC: 11 MG/DL (ref 7–18)
CALCIUM SERPL-MCNC: 8.7 MG/DL (ref 8.5–10.1)
CHLORIDE SERPL-SCNC: 109 MEQ/L (ref 98–107)
CO2 SERPL-SCNC: 27 MEQ/L (ref 21–32)
CREAT SERPL-MCNC: 1.08 MG/DL (ref 0.7–1.3)
GFR SERPL CREATININE-BSD FRML MDRD: > 60 ML/MIN/{1.73_M2} (ref 60–?)
GLUCOSE SERPL-MCNC: 93 MG/DL (ref 70–100)
POTASSIUM SERPL-SCNC: 4.2 MEQ/L (ref 3.5–5.1)
PROT SERPL-MCNC: 6.7 GM/DL (ref 6.4–8.2)
SODIUM SERPL-SCNC: 140 MEQ/L (ref 136–145)

## 2021-04-22 ENCOUNTER — HOSPITAL ENCOUNTER (OUTPATIENT)
Dept: HOSPITAL 53 - M WHC | Age: 41
End: 2021-04-22
Attending: FAMILY MEDICINE
Payer: COMMERCIAL

## 2021-04-22 ENCOUNTER — HOSPITAL ENCOUNTER (OUTPATIENT)
Dept: HOSPITAL 53 - M PLALAB | Age: 41
End: 2021-04-22
Attending: FAMILY MEDICINE
Payer: COMMERCIAL

## 2021-04-22 DIAGNOSIS — R79.89: Primary | ICD-10-CM

## 2021-04-22 DIAGNOSIS — I11.9: ICD-10-CM

## 2021-04-22 DIAGNOSIS — F10.20: ICD-10-CM

## 2021-04-22 LAB
ALBUMIN SERPL BCG-MCNC: 3.3 GM/DL (ref 3.2–5.2)
ALT SERPL W P-5'-P-CCNC: 131 U/L (ref 12–78)
BILIRUB SERPL-MCNC: 0.3 MG/DL (ref 0.2–1)
BUN SERPL-MCNC: 11 MG/DL (ref 7–18)
CALCIUM SERPL-MCNC: 8.7 MG/DL (ref 8.5–10.1)
CHLORIDE SERPL-SCNC: 108 MEQ/L (ref 98–107)
CO2 SERPL-SCNC: 28 MEQ/L (ref 21–32)
CREAT SERPL-MCNC: 1.12 MG/DL (ref 0.7–1.3)
GFR SERPL CREATININE-BSD FRML MDRD: > 60 ML/MIN/{1.73_M2} (ref 60–?)
GLUCOSE SERPL-MCNC: 97 MG/DL (ref 70–100)
HCT VFR BLD AUTO: 44.9 % (ref 42–52)
HGB BLD-MCNC: 14.4 G/DL (ref 13.5–17.5)
MCH RBC QN AUTO: 31.5 PG (ref 27–33)
MCHC RBC AUTO-ENTMCNC: 32.1 G/DL (ref 32–36.5)
MCV RBC AUTO: 98.2 FL (ref 80–96)
PLATELET # BLD AUTO: 261 10^3/UL (ref 150–450)
POTASSIUM SERPL-SCNC: 4.6 MEQ/L (ref 3.5–5.1)
PROT SERPL-MCNC: 7.2 GM/DL (ref 6.4–8.2)
RBC # BLD AUTO: 4.57 10^6/UL (ref 4.3–6.1)
SODIUM SERPL-SCNC: 141 MEQ/L (ref 136–145)
WBC # BLD AUTO: 9.7 10^3/UL (ref 4–10)

## 2021-04-22 NOTE — REP
INDICATION:

R79.89 ELEVATED LFTs



COMPARISON:



Renal ultrasound dated 03/19/2021, 07/20/2020



TECHNIQUE:

Real time gray scale ultrasound examination using curved array transducer.



FINDINGS:

Liver demonstrates fatty infiltration with focal fatty sparing adjacent to the

gallbladder fossa.  Pancreas is incompletely evaluated due to interposed bowel gas.



The gallbladder is normal and without gallstones, wall thickening, or pericholecystic

fluid.  No biliary ductal dilatation is appreciated and the common bile duct measures

4.5 mm diameter.



Right kidney is normal in reniform shape without hydronephrosis and measures 11.8 x

3.5 x 4.7 cm with a 2.1 cm lower pole stable cyst.



No ascites in the visualized right upper quadrant.







IMPRESSION:

Normal limited right upper quadrant ultrasound

Stable right renal cyst





<Electronically signed by Fabiano Gallagher > 04/22/21 [id number]

## 2021-04-23 ENCOUNTER — HOSPITAL ENCOUNTER (OUTPATIENT)
Dept: HOSPITAL 53 - M LAB REF | Age: 41
End: 2021-04-23
Attending: INTERNAL MEDICINE
Payer: COMMERCIAL

## 2021-04-23 DIAGNOSIS — R80.9: Primary | ICD-10-CM

## 2021-04-23 LAB
C3 SERPL-MCNC: 106 MG/DL (ref 90–180)
C4 SERPL-MCNC: 22 MG/DL (ref 10–40)
HBV CORE IGM SER QL: NEGATIVE
HBV SURFACE AB SER QL: NEGATIVE
HBV SURFACE AB SER-ACNC: NEGATIVE M[IU]/ML
HCV AB SER QL: 0 INDEX (ref ?–0.8)
PROT SERPL-MCNC: 7.2 GM/DL (ref 6.4–8.2)

## 2021-04-27 LAB
ALBUMIN MFR UR ELPH: 54.1 % (ref 55.8–66.1)
ALBUMIN SERPL-MCNC: 3.9 GM/DL (ref 3.29–5.55)
ALPHA1 GLOB MFR SERPL ELPH: 4.3 % (ref 2.9–4.9)
ALPHA1 GLOB SERPL ELPH-MCNC: 0.31 GM/DL (ref 0.17–0.41)
ALPHA2 GLOB SERPL ELPH-MCNC: 0.66 GM/DL (ref 0.42–0.99)
ALPHA2 GLOB SERPL ELPH-MCNC: 9.2 % (ref 7.1–11.8)
B-GLOBULIN SERPL ELPH-MCNC: 0.48 GM/DL (ref 0.28–0.6)
BETA1 GLOB MFR SERPL ELPH: 6.6 % (ref 4.7–7.2)
BETA2 GLOB MFR SERPL ELPH: 6.7 % (ref 3.2–6.5)
BETA2 GLOB SERPL ELPH-MCNC: 0.48 GM/DL (ref 0.19–0.55)
C-ANCA TITR SER IF: (no result) TITER
ENA SS-A AB SER-ACNC: <0.2 AI (ref 0–0.9)
ENA SS-B AB SER-ACNC: <0.2 AI (ref 0–0.9)
GAMMA GLOB 24H MFR UR ELPH: 19.1 % (ref 11.1–18.8)
GAMMA GLOB SERPL ELPH-MCNC: 1.38 GM/DL (ref 0.65–1.58)
IGG SERPL-MCNC: (no result) MG/DL
IMMUNOTYPING SERUM KAPPA: (no result)
IT SERUM INTERPRETATION: (no result)
P-ANCA ATYPICAL TITR SER IF: (no result) TITER
P-ANCA TITR SER IF: (no result) TITER

## 2021-05-12 ENCOUNTER — HOSPITAL ENCOUNTER (OUTPATIENT)
Dept: HOSPITAL 53 - M SFHCPLAZ | Age: 41
End: 2021-05-12
Attending: FAMILY MEDICINE
Payer: COMMERCIAL

## 2021-05-12 DIAGNOSIS — R74.01: Primary | ICD-10-CM

## 2021-05-12 LAB
ALBUMIN SERPL BCG-MCNC: 3.6 GM/DL (ref 3.2–5.2)
ALT SERPL W P-5'-P-CCNC: 88 U/L (ref 12–78)
BILIRUB CONJ SERPL-MCNC: 0.2 MG/DL (ref 0–0.2)
BILIRUB SERPL-MCNC: 0.6 MG/DL (ref 0.2–1)
PROT SERPL-MCNC: 7.6 GM/DL (ref 6.4–8.2)

## 2021-06-12 ENCOUNTER — HOSPITAL ENCOUNTER (EMERGENCY)
Dept: HOSPITAL 53 - M ED | Age: 41
Discharge: HOME | End: 2021-06-12
Payer: COMMERCIAL

## 2021-06-12 VITALS — WEIGHT: 268.52 LBS | HEIGHT: 65 IN | BODY MASS INDEX: 44.74 KG/M2

## 2021-06-12 VITALS — SYSTOLIC BLOOD PRESSURE: 138 MMHG | DIASTOLIC BLOOD PRESSURE: 86 MMHG

## 2021-06-12 DIAGNOSIS — M54.5: Primary | ICD-10-CM

## 2021-06-12 DIAGNOSIS — Z79.899: ICD-10-CM

## 2021-06-12 DIAGNOSIS — F17.200: ICD-10-CM

## 2021-06-12 DIAGNOSIS — I12.9: ICD-10-CM

## 2021-06-12 NOTE — REPVR
PROCEDURE INFORMATION: 

Exam: XR Lumbosacral Spine 

Exam date and time: 6/12/2021 7:59 PM 

Age: 40 years old 

Clinical indication: Low back pain; Additional info: Low back pain, no alex 



TECHNIQUE: 

Imaging protocol: XR of the lumbosacral spine. 

Views: 4 or 5 views. 



COMPARISON: 

No relevant prior studies available. 



FINDINGS: 

Bones/joints: Mild disc space narrowing L5-S1. Otherwise unremarkable. 

Soft tissues: Unremarkable. 



IMPRESSION: 

No acute findings.



Electronically signed by: Giovanni Minor On 06/12/2021  20:39:04 PM

## 2021-07-21 ENCOUNTER — HOSPITAL ENCOUNTER (OUTPATIENT)
Dept: HOSPITAL 53 - M PLALAB | Age: 41
End: 2021-07-21
Attending: PHYSICIAN ASSISTANT
Payer: COMMERCIAL

## 2021-07-21 DIAGNOSIS — H05.20: Primary | ICD-10-CM

## 2021-07-21 LAB
FT4I SERPL CALC-MCNC: 2.7 % (ref 1.4–3.8)
T3RU NFR SERPL: 36 % (ref 33–40)
T4 SERPL-MCNC: 7.6 UG/DL (ref 4.5–12)
TSH SERPL DL<=0.005 MIU/L-ACNC: 1.4 UIU/ML (ref 0.36–3.74)

## 2021-09-08 ENCOUNTER — HOSPITAL ENCOUNTER (OUTPATIENT)
Dept: HOSPITAL 53 - M LAB REF | Age: 41
End: 2021-09-08
Attending: INTERNAL MEDICINE
Payer: COMMERCIAL

## 2021-09-08 DIAGNOSIS — N18.2: Primary | ICD-10-CM

## 2022-03-14 ENCOUNTER — HOSPITAL ENCOUNTER (OUTPATIENT)
Dept: HOSPITAL 53 - M PLALAB | Age: 42
End: 2022-03-14
Attending: PHYSICIAN ASSISTANT
Payer: COMMERCIAL

## 2022-03-14 DIAGNOSIS — E78.00: Primary | ICD-10-CM

## 2022-03-14 LAB
ALBUMIN SERPL BCG-MCNC: 3.3 GM/DL (ref 3.2–5.2)
ALT SERPL W P-5'-P-CCNC: 99 U/L (ref 12–78)
BILIRUB CONJ SERPL-MCNC: 0.2 MG/DL (ref 0–0.2)
BILIRUB SERPL-MCNC: 0.6 MG/DL (ref 0.2–1)
CHOLEST SERPL-MCNC: 120 MG/DL (ref ?–200)
CHOLEST/HDLC SERPL: 2.1 {RATIO} (ref ?–5)
HDLC SERPL-MCNC: 57 MG/DL (ref 40–?)
LDLC SERPL CALC-MCNC: 52 MG/DL (ref ?–100)
NONHDLC SERPL-MCNC: 63 MG/DL
PROT SERPL-MCNC: 7.3 GM/DL (ref 6.4–8.2)
TRIGL SERPL-MCNC: 56 MG/DL (ref ?–150)

## 2022-05-12 ENCOUNTER — HOSPITAL ENCOUNTER (OUTPATIENT)
Dept: HOSPITAL 53 - M LAB REF | Age: 42
End: 2022-05-12
Attending: NURSE PRACTITIONER
Payer: COMMERCIAL

## 2022-05-12 DIAGNOSIS — R80.9: Primary | ICD-10-CM

## 2022-05-12 LAB
CREAT UR-MCNC: 68.7 MG/DL
PROT UR-MCNC: 50.8 MG/DL (ref 0–12)

## 2022-07-13 ENCOUNTER — HOSPITAL ENCOUNTER (OUTPATIENT)
Dept: HOSPITAL 53 - M LAB REF | Age: 42
End: 2022-07-13
Attending: NURSE PRACTITIONER
Payer: COMMERCIAL

## 2022-07-13 DIAGNOSIS — N18.2: Primary | ICD-10-CM

## 2022-07-13 LAB
ALBUMIN SERPL BCG-MCNC: 3 GM/DL (ref 3.2–5.2)
BUN SERPL-MCNC: 13 MG/DL (ref 7–18)
CALCIUM SERPL-MCNC: 8.9 MG/DL (ref 8.5–10.1)
CHLORIDE SERPL-SCNC: 106 MEQ/L (ref 98–107)
CO2 SERPL-SCNC: 27 MEQ/L (ref 21–32)
CREAT SERPL-MCNC: 1.19 MG/DL (ref 0.7–1.3)
CREAT UR-MCNC: 107 MG/DL
GFR SERPL CREATININE-BSD FRML MDRD: > 60 ML/MIN/{1.73_M2} (ref 60–?)
GLUCOSE SERPL-MCNC: 103 MG/DL (ref 70–100)
PHOSPHATE SERPL-MCNC: 3.8 MG/DL (ref 2.5–4.9)
POTASSIUM SERPL-SCNC: 4.3 MEQ/L (ref 3.5–5.1)
PROT UR-MCNC: 129.2 MG/DL (ref 0–12)
SODIUM SERPL-SCNC: 138 MEQ/L (ref 136–145)
URATE SERPL-MCNC: 7.6 MG/DL (ref 3.5–7.2)

## 2023-02-14 ENCOUNTER — HOSPITAL ENCOUNTER (OUTPATIENT)
Dept: HOSPITAL 53 - M LAB REF | Age: 43
End: 2023-02-14
Attending: NURSE PRACTITIONER
Payer: COMMERCIAL

## 2023-02-14 DIAGNOSIS — R80.9: Primary | ICD-10-CM

## 2023-02-14 LAB
CREAT UR-MCNC: 76.5 MG/DL
PROT UR-MCNC: 65.9 MG/DL (ref 0–14)

## 2023-04-26 ENCOUNTER — HOSPITAL ENCOUNTER (OUTPATIENT)
Dept: HOSPITAL 53 - M PLALAB | Age: 43
End: 2023-04-26
Attending: PHYSICIAN ASSISTANT
Payer: COMMERCIAL

## 2023-04-26 DIAGNOSIS — E78.2: Primary | ICD-10-CM

## 2023-04-26 LAB
ALBUMIN SERPL BCG-MCNC: 3.2 G/DL (ref 3.2–5.2)
ALP SERPL-CCNC: 77 U/L (ref 46–116)
ALT SERPL W P-5'-P-CCNC: 59 U/L (ref 7–40)
AST SERPL-CCNC: 53 U/L (ref ?–34)
BASOPHILS # BLD AUTO: 0.1 10^3/UL (ref 0–0.2)
BASOPHILS NFR BLD AUTO: 0.8 % (ref 0–1)
BILIRUB SERPL-MCNC: 0.3 MG/DL (ref 0.3–1.2)
BUN SERPL-MCNC: 23 MG/DL (ref 9–23)
CALCIUM SERPL-MCNC: 9.6 MG/DL (ref 8.5–10.1)
CHLORIDE SERPL-SCNC: 101 MMOL/L (ref 98–107)
CHOLEST SERPL-MCNC: 131 MG/DL (ref ?–200)
CHOLEST/HDLC SERPL: 1.94 {RATIO} (ref ?–5)
CO2 SERPL-SCNC: 24 MMOL/L (ref 20–31)
CREAT SERPL-MCNC: 1.71 MG/DL (ref 0.7–1.3)
EOSINOPHIL # BLD AUTO: 0.1 10^3/UL (ref 0–0.5)
EOSINOPHIL NFR BLD AUTO: 1.2 % (ref 0–3)
EST. AVERAGE GLUCOSE BLD GHB EST-MCNC: 108 MG/DL (ref 60–110)
GFR SERPL CREATININE-BSD FRML MDRD: 56.4 ML/MIN/{1.73_M2} (ref 60–?)
GLUCOSE SERPL-MCNC: 80 MG/DL (ref 60–100)
HCT VFR BLD AUTO: 45.4 % (ref 42–52)
HDLC SERPL-MCNC: 67.2 MG/DL (ref 40–?)
HGB BLD-MCNC: 14.9 G/DL (ref 13.5–17.5)
LDLC SERPL CALC-MCNC: 50 MG/DL (ref ?–100)
LYMPHOCYTES # BLD AUTO: 2 10^3/UL (ref 1.5–5)
LYMPHOCYTES NFR BLD AUTO: 26.5 % (ref 24–44)
MCH RBC QN AUTO: 31 PG (ref 27–33)
MCHC RBC AUTO-ENTMCNC: 32.8 G/DL (ref 32–36.5)
MCV RBC AUTO: 94.6 FL (ref 80–96)
MONOCYTES # BLD AUTO: 1.3 10^3/UL (ref 0–0.8)
MONOCYTES NFR BLD AUTO: 16.8 % (ref 2–8)
NEUTROPHILS # BLD AUTO: 4.1 10^3/UL (ref 1.5–8.5)
NEUTROPHILS NFR BLD AUTO: 54 % (ref 36–66)
NONHDLC SERPL-MCNC: 63.8 MG/DL
PLATELET # BLD AUTO: 233 10^3/UL (ref 150–450)
POTASSIUM SERPL-SCNC: 5.1 MMOL/L (ref 3.5–5.1)
PROT SERPL-MCNC: 7.7 G/DL (ref 5.7–8.2)
RBC # BLD AUTO: 4.8 10^6/UL (ref 4.3–6.1)
SODIUM SERPL-SCNC: 130 MMOL/L (ref 136–145)
TRIGL SERPL-MCNC: 69 MG/DL (ref ?–150)
WBC # BLD AUTO: 7.6 10^3/UL (ref 4–10)

## 2023-05-17 ENCOUNTER — HOSPITAL ENCOUNTER (OUTPATIENT)
Dept: HOSPITAL 53 - M PLALAB | Age: 43
End: 2023-05-17
Attending: PHYSICIAN ASSISTANT
Payer: COMMERCIAL

## 2023-05-17 DIAGNOSIS — N28.9: Primary | ICD-10-CM

## 2023-05-17 LAB
ALBUMIN SERPL BCG-MCNC: 3.5 G/DL (ref 3.2–5.2)
APPEARANCE UR: CLEAR
BACTERIA UR QL AUTO: NEGATIVE
BILIRUB UR QL STRIP.AUTO: NEGATIVE
BUN SERPL-MCNC: 12 MG/DL (ref 9–23)
CALCIUM SERPL-MCNC: 9.2 MG/DL (ref 8.5–10.1)
CHLORIDE SERPL-SCNC: 101 MMOL/L (ref 98–107)
CO2 SERPL-SCNC: 28 MMOL/L (ref 20–31)
CREAT SERPL-MCNC: 1.13 MG/DL (ref 0.7–1.3)
CREAT UR-MCNC: 76.6 MG/DL
GFR SERPL CREATININE-BSD FRML MDRD: > 60 ML/MIN/{1.73_M2} (ref 60–?)
GLUCOSE SERPL-MCNC: 94 MG/DL (ref 60–100)
GLUCOSE UR QL STRIP.AUTO: NEGATIVE MG/DL
HGB UR QL STRIP.AUTO: (no result)
KETONES UR QL STRIP.AUTO: NEGATIVE MG/DL
LEUKOCYTE ESTERASE UR QL STRIP.AUTO: NEGATIVE
MICROALBUMIN UR-MCNC: 230 MG/L
MICROALBUMIN/CREAT UR: 300.2 MCG/MG (ref 0–30)
MUCOUS THREADS URNS QL MICRO: (no result)
NITRITE UR QL STRIP.AUTO: NEGATIVE
PH UR STRIP.AUTO: 5 UNITS (ref 5–9)
PHOSPHATE SERPL-MCNC: 4.1 MG/DL (ref 2.5–4.9)
POTASSIUM SERPL-SCNC: 4.5 MMOL/L (ref 3.5–5.1)
PROT UR QL STRIP.AUTO: (no result) MG/DL
RBC # UR AUTO: 1 /HPF (ref 0–3)
SODIUM SERPL-SCNC: 134 MMOL/L (ref 136–145)
SP GR UR STRIP.AUTO: 1.01 (ref 1–1.03)
SQUAMOUS #/AREA URNS AUTO: 0 /HPF (ref 0–6)
UROBILINOGEN UR QL STRIP.AUTO: 0.2 MG/DL (ref 0–2)
WBC #/AREA URNS AUTO: 0 /HPF (ref 0–3)

## 2024-02-07 ENCOUNTER — HOSPITAL ENCOUNTER (OUTPATIENT)
Dept: HOSPITAL 53 - M OPP | Age: 44
Discharge: HOME | End: 2024-02-07
Attending: SURGERY
Payer: COMMERCIAL

## 2024-02-07 VITALS — TEMPERATURE: 96.4 F

## 2024-02-07 VITALS — OXYGEN SATURATION: 94 % | SYSTOLIC BLOOD PRESSURE: 127 MMHG | DIASTOLIC BLOOD PRESSURE: 78 MMHG

## 2024-02-07 VITALS — BODY MASS INDEX: 39.65 KG/M2 | WEIGHT: 238 LBS | HEIGHT: 65 IN

## 2024-02-07 DIAGNOSIS — K63.5: ICD-10-CM

## 2024-02-07 DIAGNOSIS — Z99.89: ICD-10-CM

## 2024-02-07 DIAGNOSIS — G47.30: ICD-10-CM

## 2024-02-07 DIAGNOSIS — Z80.0: ICD-10-CM

## 2024-02-07 DIAGNOSIS — Z79.899: ICD-10-CM

## 2024-02-07 DIAGNOSIS — Z12.11: Primary | ICD-10-CM

## 2024-02-07 DIAGNOSIS — Z79.51: ICD-10-CM

## 2024-02-07 DIAGNOSIS — F17.200: ICD-10-CM

## 2024-02-07 DIAGNOSIS — Z79.02: ICD-10-CM

## 2024-02-07 DIAGNOSIS — K57.30: ICD-10-CM

## 2024-02-07 RX ADMIN — SODIUM CHLORIDE ONE MLS/HR: 9 INJECTION, SOLUTION INTRAVENOUS at 08:57

## 2024-04-16 ENCOUNTER — HOSPITAL ENCOUNTER (OUTPATIENT)
Dept: HOSPITAL 53 - M LAB REF | Age: 44
End: 2024-04-16
Attending: NURSE PRACTITIONER
Payer: COMMERCIAL

## 2024-04-16 DIAGNOSIS — D50.9: Primary | ICD-10-CM

## 2024-04-16 LAB
FOLATE SERPL-MCNC: 12.3 NG/ML (ref 5.4–?)
VIT B12 SERPL-MCNC: 548 PG/ML (ref 211–911)

## 2024-04-18 LAB
FERRITIN SERPL-MCNC: 212.9 NG/ML (ref 10.5–307.3)
IRON SATN MFR SERPL: 46.6 % (ref 19.7–50)
IRON SERPL-MCNC: 139 UG/DL (ref 65–175)
TIBC SERPL-MCNC: 298 UG/DL (ref 250–425)